# Patient Record
Sex: MALE | Race: WHITE
[De-identification: names, ages, dates, MRNs, and addresses within clinical notes are randomized per-mention and may not be internally consistent; named-entity substitution may affect disease eponyms.]

---

## 2018-03-16 ENCOUNTER — HOSPITAL ENCOUNTER (EMERGENCY)
Dept: HOSPITAL 5 - ED | Age: 38
Discharge: HOME | End: 2018-03-16
Payer: SELF-PAY

## 2018-03-16 VITALS — SYSTOLIC BLOOD PRESSURE: 113 MMHG | DIASTOLIC BLOOD PRESSURE: 75 MMHG
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
## 2018-03-16 DIAGNOSIS — T78.40XA: Primary | ICD-10-CM

## 2018-03-16 PROCEDURE — 96375 TX/PRO/DX INJ NEW DRUG ADDON: CPT

## 2018-03-16 PROCEDURE — 99283 EMERGENCY DEPT VISIT LOW MDM: CPT

## 2018-03-16 PROCEDURE — 96374 THER/PROPH/DIAG INJ IV PUSH: CPT

## 2018-03-16 NOTE — EMERGENCY DEPARTMENT REPORT
ED General Adult HPI





- General


Chief complaint: Allergic Reaction


Stated complaint: ALLERGIC REACTION


Time Seen by Provider: 03/16/18 09:35


Source: patient, family


Mode of arrival: Wheelchair


Limitations: Language Barrier





- History of Present Illness


Initial comments: 


This is the second such similar event.  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 

construction.  He was using some type of glue yesterday.  It got on his hands 

and he rubbed his face.  He developed swelling of his face quite diffusely.  He 

has no difficulty swallowing.  He can't open his left eye at all and his right 

eye minimally.  He denies any respiratory symptoms.  He denies any fever or 

chills.  The prior episode occurred after exposure to the same chemical glue.


I obtained the history in [place].


-: Gradual, hour(s)


Location: face


Radiation: non-radiation


Quality: other (swelling)


Consistency: constant


Improves with: none


Worsens with: none


Associated Symptoms: denies other symptoms


Treatments Prior to Arrival: none





- Related Data


 Previous Rx's











 Medication  Instructions  Recorded  Last Taken  Type


 


predniSONE [Deltasone] 40 mg PO QDAY #14 tab 03/16/18 Unknown Rx











 Allergies











Allergy/AdvReac Type Severity Reaction Status Date / Time


 


Penicillins Allergy  Itching Verified 03/16/18 09:17














ED Review of Systems


ROS: 


Stated complaint: ALLERGIC REACTION


Other details as noted in HPI





Constitutional: denies: chills, fever


Eyes: as per HPI.  denies: eye pain, eye discharge, vision change


ENT: as per HPI.  denies: ear pain, throat pain


Respiratory: denies: cough, shortness of breath, wheezing


Cardiovascular: denies: chest pain, palpitations


Endocrine: no symptoms reported


Gastrointestinal: denies: abdominal pain, nausea, diarrhea


Genitourinary: denies: urgency, dysuria


Musculoskeletal: denies: back pain, joint swelling, arthralgia


Skin: as per HPI, change in color.  denies: rash, lesions


Neurological: denies: headache, weakness, paresthesias


Psychiatric: denies: anxiety, depression


Hematological/Lymphatic: denies: easy bleeding, easy bruising





ED Past Medical Hx





- Past Medical History


Previous Medical History?: No





- Surgical History


Past Surgical History?: No





- Social History


Other Social History: 


.








- Medications


Home Medications: 


 Home Medications











 Medication  Instructions  Recorded  Confirmed  Last Taken  Type


 


predniSONE [Deltasone] 40 mg PO QDAY #14 tab 03/16/18  Unknown Rx














ED Physical Exam





- General


Limitations: No Limitations


General appearance: alert, in no apparent distress





- Head


Head exam: Present: atraumatic, normocephalic





- Eye


Eye exam: Present: PERRL, EOMI, periorbital swelling





- ENT


ENT exam: Present: normal orophraynx, mucous membranes moist





- Neck


Neck exam: Present: normal inspection.  Absent: tenderness, meningismus





- Respiratory


Respiratory exam: Present: normal lung sounds bilaterally.  Absent: respiratory 

distress





- Cardiovascular


Cardiovascular Exam: Present: regular rate, normal rhythm.  Absent: systolic 

murmur, diastolic murmur, rubs, gallop





- GI/Abdominal


GI/Abdominal exam: Present: soft, normal bowel sounds.  Absent: distended, 

tenderness, guarding, rebound, rigid





- Rectal


Rectal exam: Present: deferred





- Extremities Exam


Extremities exam: Present: normal inspection





- Back Exam


Back exam: Present: normal inspection





- Neurological Exam


Neurological exam: Present: alert, oriented X3, CN II-XII intact.  Absent: 

motor sensory deficit





- Psychiatric


Psychiatric exam: Present: normal affect, normal mood





- Skin


Skin exam: Present: warm, dry, intact, normal color.  Absent: rash





ED Course


 Vital Signs











  03/16/18 03/16/18 03/16/18





  09:17 09:19 09:30


 


Temperature 98.9 F  


 


Pulse Rate 74 80 


 


Respiratory 18  





Rate   


 


Blood Pressure 120/89  


 


O2 Sat by Pulse 100  99





Oximetry   














  03/16/18 03/16/18 03/16/18





  09:45 10:00 10:15


 


Temperature   


 


Pulse Rate   


 


Respiratory   





Rate   


 


Blood Pressure 131/85 121/82 121/82


 


O2 Sat by Pulse 99 99 98





Oximetry   














  03/16/18 03/16/18 03/16/18





  10:19 10:31 10:45


 


Temperature   


 


Pulse Rate 82  


 


Respiratory   





Rate   


 


Blood Pressure  121/82 121/82


 


O2 Sat by Pulse  99 99





Oximetry   














  03/16/18 03/16/18 03/16/18





  11:00 11:15 11:19


 


Temperature   


 


Pulse Rate   78


 


Respiratory   





Rate   


 


Blood Pressure 122/77 122/77 


 


O2 Sat by Pulse 100 99 





Oximetry   














  03/16/18 03/16/18 03/16/18





  11:31 11:45 11:49


 


Temperature   


 


Pulse Rate   


 


Respiratory   





Rate   


 


Blood Pressure 122/77 122/77 


 


O2 Sat by Pulse 99 98 100





Oximetry   














- Reevaluation(s)


Reevaluation #1: 


On reevaluation the patient's facial edema and erythema has remarkably 

improved.  It is about 1+ compared to 3+.  He never developed any signs of 

airway or oral edema.  He is appropriate for outpatient follow-up.


03/16/18 14:12





Critical care attestation.: 


If time is entered above; I have spent that time in minutes in the direct care 

of this critically ill patient, excluding procedure time.








ED Disposition


Clinical Impression: 


Allergic reaction


Qualifiers:


 Encounter type: initial encounter Qualified Code(s): T78.40XA - Allergy, 

unspecified, initial encounter





Contact dermatitis


Qualifiers:


 Contact dermatitis type: unspecified Contact dermatitis trigger: unspecified 

trigger Qualified Code(s): L25.9 - Unspecified contact dermatitis, unspecified 

cause





Disposition: DC-01 TO HOME OR SELFCARE


Is pt being admited?: No


Does the pt Need Aspirin: No


Condition: Stable


Instructions:  Urticaria (ED), Contact Dermatitis (ED)


Additional Instructions: 


Avoid contact with chemicals particularly that particular one in the future.  

Rx as directed.  Follow-up with a primary care physician.  See referral.  

Benadryl 50 mg every 6 hours over-the-counter as needed for swelling or itching.


Prescriptions: 


predniSONE [Deltasone] 40 mg PO QDAY #14 tab


Referrals: 


[facility]ARE,MD [Primary Care Provider] - 3-5 Days


[facility] [Provider Group] - 3-5 Days


Time of Disposition: 14:16